# Patient Record
Sex: MALE | Race: WHITE | Employment: OTHER | URBAN - METROPOLITAN AREA
[De-identification: names, ages, dates, MRNs, and addresses within clinical notes are randomized per-mention and may not be internally consistent; named-entity substitution may affect disease eponyms.]

---

## 2020-11-03 PROBLEM — I10 HYPERTENSION: Status: RESOLVED | Noted: 2020-11-03 | Resolved: 2020-11-03

## 2020-11-03 PROBLEM — E78.5 HYPERLIPIDEMIA: Status: RESOLVED | Noted: 2020-11-03 | Resolved: 2020-11-03

## 2021-09-23 ENCOUNTER — HOSPITAL ENCOUNTER (EMERGENCY)
Age: 66
Discharge: ANOTHER HOSPITAL | End: 2021-09-24
Attending: EMERGENCY MEDICINE
Payer: MEDICARE

## 2021-09-23 DIAGNOSIS — U07.1 PNEUMONIA DUE TO COVID-19 VIRUS: Primary | ICD-10-CM

## 2021-09-23 DIAGNOSIS — J12.82 PNEUMONIA DUE TO COVID-19 VIRUS: Primary | ICD-10-CM

## 2021-09-23 DIAGNOSIS — R09.02 HYPOXIA: ICD-10-CM

## 2021-09-23 DIAGNOSIS — R10.84 GENERALIZED ABDOMINAL PAIN: ICD-10-CM

## 2021-09-23 LAB
ALBUMIN SERPL-MCNC: 4 G/DL (ref 3.5–4.6)
ALP BLD-CCNC: 82 U/L (ref 35–104)
ALT SERPL-CCNC: 25 U/L (ref 0–41)
AMYLASE: 79 U/L (ref 22–93)
ANION GAP SERPL CALCULATED.3IONS-SCNC: 12 MEQ/L (ref 9–15)
AST SERPL-CCNC: 27 U/L (ref 0–40)
BILIRUB SERPL-MCNC: 0.5 MG/DL (ref 0.2–0.7)
BUN BLDV-MCNC: 46 MG/DL (ref 8–23)
CALCIUM SERPL-MCNC: 9.2 MG/DL (ref 8.5–9.9)
CHLORIDE BLD-SCNC: 93 MEQ/L (ref 95–107)
CO2: 27 MEQ/L (ref 20–31)
CREAT SERPL-MCNC: 1.15 MG/DL (ref 0.7–1.2)
GFR AFRICAN AMERICAN: >60
GFR NON-AFRICAN AMERICAN: >60
GLOBULIN: 2.8 G/DL (ref 2.3–3.5)
GLUCOSE BLD-MCNC: 119 MG/DL (ref 70–99)
HCT VFR BLD CALC: 47.5 % (ref 42–52)
HEMOGLOBIN: 15.7 G/DL (ref 13.7–17.5)
LIPASE: 96 U/L (ref 12–95)
MCH RBC QN AUTO: 28.6 PG (ref 25.7–32.2)
MCHC RBC AUTO-ENTMCNC: 33.1 % (ref 32.3–36.5)
MCV RBC AUTO: 86.5 FL (ref 79–92.2)
PDW BLD-RTO: 14.5 % (ref 11.6–14.4)
PLATELET # BLD: 131 K/UL (ref 163–337)
POTASSIUM SERPL-SCNC: 4.1 MEQ/L (ref 3.4–4.9)
RBC # BLD: 5.49 M/UL (ref 4.63–6.08)
SODIUM BLD-SCNC: 132 MEQ/L (ref 135–144)
TOTAL PROTEIN: 6.8 G/DL (ref 6.3–8)
WBC # BLD: 11.5 K/UL (ref 4.2–9)

## 2021-09-23 PROCEDURE — 36415 COLL VENOUS BLD VENIPUNCTURE: CPT

## 2021-09-23 PROCEDURE — 85027 COMPLETE CBC AUTOMATED: CPT

## 2021-09-23 PROCEDURE — 80053 COMPREHEN METABOLIC PANEL: CPT

## 2021-09-23 PROCEDURE — 83690 ASSAY OF LIPASE: CPT

## 2021-09-23 PROCEDURE — 99285 EMERGENCY DEPT VISIT HI MDM: CPT

## 2021-09-23 PROCEDURE — 82150 ASSAY OF AMYLASE: CPT

## 2021-09-23 RX ORDER — MORPHINE SULFATE 4 MG/ML
4 INJECTION, SOLUTION INTRAMUSCULAR; INTRAVENOUS ONCE
Status: COMPLETED | OUTPATIENT
Start: 2021-09-23 | End: 2021-09-24

## 2021-09-23 RX ORDER — ONDANSETRON 2 MG/ML
4 INJECTION INTRAMUSCULAR; INTRAVENOUS ONCE
Status: COMPLETED | OUTPATIENT
Start: 2021-09-23 | End: 2021-09-24

## 2021-09-23 RX ORDER — 0.9 % SODIUM CHLORIDE 0.9 %
1000 INTRAVENOUS SOLUTION INTRAVENOUS ONCE
Status: COMPLETED | OUTPATIENT
Start: 2021-09-23 | End: 2021-09-24

## 2021-09-23 ASSESSMENT — ENCOUNTER SYMPTOMS
SHORTNESS OF BREATH: 0
COUGH: 0
COLOR CHANGE: 0
BACK PAIN: 0
ABDOMINAL PAIN: 1
SINUS PAIN: 0
NAUSEA: 0
DIARRHEA: 0
VOMITING: 0
SORE THROAT: 0
EYE DISCHARGE: 0
EYE REDNESS: 0

## 2021-09-23 ASSESSMENT — PAIN DESCRIPTION - LOCATION: LOCATION: ABDOMEN

## 2021-09-23 ASSESSMENT — PAIN SCALES - GENERAL: PAINLEVEL_OUTOF10: 6

## 2021-09-24 ENCOUNTER — APPOINTMENT (OUTPATIENT)
Dept: GENERAL RADIOLOGY | Age: 66
End: 2021-09-24
Payer: MEDICARE

## 2021-09-24 ENCOUNTER — HOSPITAL ENCOUNTER (INPATIENT)
Age: 66
LOS: 1 days | Discharge: HOME | DRG: 177 | End: 2021-09-24
Attending: INTERNAL MEDICINE | Admitting: INTERNAL MEDICINE
Payer: MEDICARE

## 2021-09-24 ENCOUNTER — APPOINTMENT (OUTPATIENT)
Dept: CT IMAGING | Age: 66
End: 2021-09-24
Payer: MEDICARE

## 2021-09-24 VITALS
HEART RATE: 80 BPM | SYSTOLIC BLOOD PRESSURE: 112 MMHG | DIASTOLIC BLOOD PRESSURE: 69 MMHG | BODY MASS INDEX: 37.13 KG/M2 | OXYGEN SATURATION: 96 % | TEMPERATURE: 98.4 F | HEIGHT: 68 IN | RESPIRATION RATE: 18 BRPM | WEIGHT: 245 LBS

## 2021-09-24 VITALS
OXYGEN SATURATION: 95 % | HEART RATE: 82 BPM | DIASTOLIC BLOOD PRESSURE: 65 MMHG | SYSTOLIC BLOOD PRESSURE: 117 MMHG | TEMPERATURE: 97.5 F | RESPIRATION RATE: 18 BRPM

## 2021-09-24 PROBLEM — U07.1 COVID-19: Status: ACTIVE | Noted: 2021-09-24

## 2021-09-24 LAB
BILIRUBIN URINE: NORMAL
BLOOD, URINE: NEGATIVE
CLARITY: CLEAR
COLOR: NORMAL
D DIMER: 1.25 MG/L FEU (ref 0–0.5)
EKG ATRIAL RATE: 71 BPM
EKG P AXIS: 49 DEGREES
EKG P-R INTERVAL: 156 MS
EKG Q-T INTERVAL: 414 MS
EKG QRS DURATION: 144 MS
EKG QTC CALCULATION (BAZETT): 449 MS
EKG R AXIS: -53 DEGREES
EKG T AXIS: -25 DEGREES
EKG VENTRICULAR RATE: 71 BPM
GLUCOSE URINE: NEGATIVE MG/DL
KETONES, URINE: NEGATIVE MG/DL
LEUKOCYTE ESTERASE, URINE: NEGATIVE
NITRITE, URINE: NEGATIVE
PH UA: 5 (ref 5–9)
PROCALCITONIN: 0.11 NG/ML (ref 0–0.15)
PROTEIN UA: NORMAL MG/DL
SARS-COV-2, NAAT: DETECTED
SPECIFIC GRAVITY UA: 1.02 (ref 1–1.03)
URINE REFLEX TO CULTURE: NORMAL
UROBILINOGEN, URINE: 0.2 E.U./DL

## 2021-09-24 PROCEDURE — 71275 CT ANGIOGRAPHY CHEST: CPT

## 2021-09-24 PROCEDURE — 36415 COLL VENOUS BLD VENIPUNCTURE: CPT

## 2021-09-24 PROCEDURE — 2580000003 HC RX 258: Performed by: EMERGENCY MEDICINE

## 2021-09-24 PROCEDURE — 71045 X-RAY EXAM CHEST 1 VIEW: CPT

## 2021-09-24 PROCEDURE — 96375 TX/PRO/DX INJ NEW DRUG ADDON: CPT

## 2021-09-24 PROCEDURE — G0378 HOSPITAL OBSERVATION PER HR: HCPCS

## 2021-09-24 PROCEDURE — 93005 ELECTROCARDIOGRAM TRACING: CPT

## 2021-09-24 PROCEDURE — 81003 URINALYSIS AUTO W/O SCOPE: CPT

## 2021-09-24 PROCEDURE — 84145 PROCALCITONIN (PCT): CPT

## 2021-09-24 PROCEDURE — 87635 SARS-COV-2 COVID-19 AMP PRB: CPT

## 2021-09-24 PROCEDURE — 6360000002 HC RX W HCPCS: Performed by: EMERGENCY MEDICINE

## 2021-09-24 PROCEDURE — XW033E5 INTRODUCTION OF REMDESIVIR ANTI-INFECTIVE INTO PERIPHERAL VEIN, PERCUTANEOUS APPROACH, NEW TECHNOLOGY GROUP 5: ICD-10-PCS | Performed by: INTERNAL MEDICINE

## 2021-09-24 PROCEDURE — 96374 THER/PROPH/DIAG INJ IV PUSH: CPT

## 2021-09-24 PROCEDURE — 6360000004 HC RX CONTRAST MEDICATION: Performed by: EMERGENCY MEDICINE

## 2021-09-24 PROCEDURE — 74176 CT ABD & PELVIS W/O CONTRAST: CPT

## 2021-09-24 PROCEDURE — 99221 1ST HOSP IP/OBS SF/LOW 40: CPT | Performed by: INTERNAL MEDICINE

## 2021-09-24 PROCEDURE — 96361 HYDRATE IV INFUSION ADD-ON: CPT

## 2021-09-24 PROCEDURE — G0379 DIRECT REFER HOSPITAL OBSERV: HCPCS

## 2021-09-24 PROCEDURE — 85379 FIBRIN DEGRADATION QUANT: CPT

## 2021-09-24 PROCEDURE — 1210000000 HC MED SURG R&B

## 2021-09-24 PROCEDURE — 93010 ELECTROCARDIOGRAM REPORT: CPT | Performed by: INTERNAL MEDICINE

## 2021-09-24 RX ORDER — ONDANSETRON 2 MG/ML
4 INJECTION INTRAMUSCULAR; INTRAVENOUS EVERY 6 HOURS PRN
Status: DISCONTINUED | OUTPATIENT
Start: 2021-09-24 | End: 2021-09-24 | Stop reason: HOSPADM

## 2021-09-24 RX ORDER — PANTOPRAZOLE SODIUM 40 MG/1
40 TABLET, DELAYED RELEASE ORAL
Status: DISCONTINUED | OUTPATIENT
Start: 2021-09-25 | End: 2021-09-24 | Stop reason: HOSPADM

## 2021-09-24 RX ORDER — DEXAMETHASONE SODIUM PHOSPHATE 4 MG/ML
6 INJECTION, SOLUTION INTRA-ARTICULAR; INTRALESIONAL; INTRAMUSCULAR; INTRAVENOUS; SOFT TISSUE EVERY 24 HOURS
Status: DISCONTINUED | OUTPATIENT
Start: 2021-09-24 | End: 2021-09-24 | Stop reason: HOSPADM

## 2021-09-24 RX ORDER — 0.9 % SODIUM CHLORIDE 0.9 %
500 INTRAVENOUS SOLUTION INTRAVENOUS ONCE
Status: COMPLETED | OUTPATIENT
Start: 2021-09-24 | End: 2021-09-24

## 2021-09-24 RX ORDER — LISINOPRIL AND HYDROCHLOROTHIAZIDE 12.5; 1 MG/1; MG/1
1 TABLET ORAL DAILY
Status: DISCONTINUED | OUTPATIENT
Start: 2021-09-24 | End: 2021-09-24 | Stop reason: HOSPADM

## 2021-09-24 RX ORDER — SODIUM CHLORIDE 0.9 % (FLUSH) 0.9 %
5-40 SYRINGE (ML) INJECTION PRN
Status: DISCONTINUED | OUTPATIENT
Start: 2021-09-24 | End: 2021-09-24 | Stop reason: HOSPADM

## 2021-09-24 RX ORDER — SODIUM CHLORIDE 0.9 % (FLUSH) 0.9 %
5-40 SYRINGE (ML) INJECTION EVERY 12 HOURS SCHEDULED
Status: DISCONTINUED | OUTPATIENT
Start: 2021-09-24 | End: 2021-09-24 | Stop reason: HOSPADM

## 2021-09-24 RX ORDER — ONDANSETRON 4 MG/1
4 TABLET, ORALLY DISINTEGRATING ORAL EVERY 8 HOURS PRN
Status: DISCONTINUED | OUTPATIENT
Start: 2021-09-24 | End: 2021-09-24 | Stop reason: HOSPADM

## 2021-09-24 RX ORDER — 0.9 % SODIUM CHLORIDE 0.9 %
30 INTRAVENOUS SOLUTION INTRAVENOUS PRN
Status: DISCONTINUED | OUTPATIENT
Start: 2021-09-24 | End: 2021-09-24 | Stop reason: HOSPADM

## 2021-09-24 RX ORDER — DEXAMETHASONE SODIUM PHOSPHATE 10 MG/ML
10 INJECTION, SOLUTION INTRAMUSCULAR; INTRAVENOUS ONCE
Status: COMPLETED | OUTPATIENT
Start: 2021-09-24 | End: 2021-09-24

## 2021-09-24 RX ORDER — ACETAMINOPHEN 325 MG/1
650 TABLET ORAL EVERY 6 HOURS PRN
Status: DISCONTINUED | OUTPATIENT
Start: 2021-09-24 | End: 2021-09-24 | Stop reason: HOSPADM

## 2021-09-24 RX ORDER — POLYETHYLENE GLYCOL 3350 17 G/17G
17 POWDER, FOR SOLUTION ORAL DAILY PRN
Status: DISCONTINUED | OUTPATIENT
Start: 2021-09-24 | End: 2021-09-24 | Stop reason: HOSPADM

## 2021-09-24 RX ORDER — 0.9 % SODIUM CHLORIDE 0.9 %
500 INTRAVENOUS SOLUTION INTRAVENOUS ONCE
Status: DISCONTINUED | OUTPATIENT
Start: 2021-09-24 | End: 2021-09-24 | Stop reason: HOSPADM

## 2021-09-24 RX ORDER — GUAIFENESIN/DEXTROMETHORPHAN 100-10MG/5
5 SYRUP ORAL EVERY 4 HOURS PRN
Status: DISCONTINUED | OUTPATIENT
Start: 2021-09-24 | End: 2021-09-24 | Stop reason: HOSPADM

## 2021-09-24 RX ORDER — ACETAMINOPHEN 650 MG/1
650 SUPPOSITORY RECTAL EVERY 6 HOURS PRN
Status: DISCONTINUED | OUTPATIENT
Start: 2021-09-24 | End: 2021-09-24 | Stop reason: HOSPADM

## 2021-09-24 RX ORDER — SODIUM CHLORIDE 9 MG/ML
25 INJECTION, SOLUTION INTRAVENOUS PRN
Status: DISCONTINUED | OUTPATIENT
Start: 2021-09-24 | End: 2021-09-24 | Stop reason: HOSPADM

## 2021-09-24 RX ORDER — ATORVASTATIN CALCIUM 40 MG/1
40 TABLET, FILM COATED ORAL NIGHTLY
Status: DISCONTINUED | OUTPATIENT
Start: 2021-09-24 | End: 2021-09-24 | Stop reason: HOSPADM

## 2021-09-24 RX ORDER — VITAMIN B COMPLEX
2000 TABLET ORAL DAILY
Status: DISCONTINUED | OUTPATIENT
Start: 2021-09-24 | End: 2021-09-24 | Stop reason: HOSPADM

## 2021-09-24 RX ORDER — ASPIRIN 81 MG/1
81 TABLET ORAL DAILY
Status: DISCONTINUED | OUTPATIENT
Start: 2021-09-24 | End: 2021-09-24 | Stop reason: HOSPADM

## 2021-09-24 RX ADMIN — SODIUM CHLORIDE 500 ML: 9 INJECTION, SOLUTION INTRAVENOUS at 01:24

## 2021-09-24 RX ADMIN — DEXAMETHASONE SODIUM PHOSPHATE 10 MG: 10 INJECTION, SOLUTION INTRAMUSCULAR; INTRAVENOUS at 01:23

## 2021-09-24 RX ADMIN — IOPAMIDOL 100 ML: 755 INJECTION, SOLUTION INTRAVENOUS at 07:13

## 2021-09-24 RX ADMIN — SODIUM CHLORIDE 1000 ML: 9 INJECTION, SOLUTION INTRAVENOUS at 00:03

## 2021-09-24 RX ADMIN — ONDANSETRON 4 MG: 2 INJECTION INTRAMUSCULAR; INTRAVENOUS at 00:05

## 2021-09-24 RX ADMIN — MORPHINE SULFATE 4 MG: 4 INJECTION, SOLUTION INTRAMUSCULAR; INTRAVENOUS at 00:05

## 2021-09-24 ASSESSMENT — PAIN SCALES - GENERAL
PAINLEVEL_OUTOF10: 0
PAINLEVEL_OUTOF10: 1
PAINLEVEL_OUTOF10: 7

## 2021-09-24 ASSESSMENT — PAIN DESCRIPTION - LOCATION: LOCATION: ABDOMEN

## 2021-09-24 NOTE — ED NOTES
Walking pulse ox decreased to 84% while ambulating in room on room air. Patient denied any shortness of breath or difficulty breathing.       Anum Zelaya RN  09/24/21 [id number]

## 2021-09-24 NOTE — ED NOTES
TC returned call connecting Dr. Marlene Vogt and Dr. Mamta Pressley who accepts pt to [facility].       Elizabet Mg RN  09/24/21 [id number]

## 2021-09-24 NOTE — CONSULTS
Pulmonary Medicine  Consult Note      Reason for consultation: COVID-19 infection    Consulting physician: Dr. Chong Martin:      This is 76-year-old male with significant past medical history for hypertension, hyperlipidemia, diabetes mellitus was presented at ER at [facility] with abdominal pain. Patient was in ER and he has questionable hypoxia in the emergency room when he ambulated O2 sat drops in 80s so he was transferred to [facility]. .  He had a CT which was negative for PE showed bilateral infiltration consistent with COVID-19 pneumonia patient was admitted here due to Covid infection with pneumonia and hypoxia but patient was not hypoxic when he came here his room air O2 saturation was 95%. As per RN he has no O2 desaturation even going to bathroom in his room. Patient qualified for remdesivir due to documented hypoxia but patient declined and wanted to go home. When I came here patient is ready to go home and he is dressed up. I discussed with RN he also declined to take pulse oximetry at home. He denies having any shortness of breath. No cough or sputum production. He said he had a headache Monday and Tuesday and he think he has low-grade fever for 2 days but then he was feeling fine until last night he has some abdominal pain which he said it completely resolved. It looks like he is declining any treatment and wants to go home. Past Medical History:        Diagnosis Date    Arthritis     [facility] [address]     Diabetes mellitus ([facility] [address]     Hyperlipidemia     Hypertension     Hyperuricemia     Obesity        Past Surgical History:        Procedure Laterality Date    BACK SURGERY  1984    COLONOSCOPY  2006    SKIN BIOPSY      TONSILLECTOMY AND ADENOIDECTOMY         Social History:     reports that he has never smoked. He has never used smokeless tobacco. He reports that he does not drink alcohol and does not use drugs.     Family History: Problem Relation Age of Onset    Diabetes Father        Allergies:  Patient has no known allergies. MEDICATIONS during current hospitalization:    Continuous Infusions:   sodium chloride         Scheduled Meds:   sodium chloride flush  5-40 mL IntraVENous 2 times per day    enoxaparin  30 mg SubCUTAneous BID    [START ON 9/25/2021] pantoprazole  40 mg Oral QAM AC    insulin lispro  0-12 Units SubCUTAneous TID WC    insulin lispro  0-6 Units SubCUTAneous Nightly    dexamethasone  6 mg IntraVENous Q24H    Vitamin D  2,000 Units Oral Daily    lisinopril-hydroCHLOROthiazide  1 tablet Oral Daily    aspirin  81 mg Oral Daily    atorvastatin  40 mg Oral Nightly    remdesivir IVPB  200 mg IntraVENous Once    Followed by   Gloria Jarrell ON 9/25/2021] remdesivir IVPB  100 mg IntraVENous Q24H       PRN Meds:sodium chloride flush, sodium chloride, ondansetron **OR** ondansetron, polyethylene glycol, acetaminophen **OR** acetaminophen, guaiFENesin-dextromethorphan, sodium chloride    REVIEW OF SYSTEMS:  As in history of present illness  Other 14 point review of system is negative. PHYSICAL EXAM:    Vitals:  /65   Pulse 82   Temp 97.5 °F (36.4 °C) (Oral)   Resp 18   SpO2 95%   General:  Alert, awake, Oriented x3  , No distress. Head: Atraumatic , Normocephalic   Eyes: PERRL. No sclera icterus. No conjunctival injection. No discharge   Chest : Bilaterally symmetrical ,Normal effort,  No accessory muscle use  Lung :  No Rales. No wheezing. No rhonchi. Heart[de-identified] normal  rate. Regular rhythm. No mumur   ABD: Non-tender. Non-distended. No masses. No organmegaly. Normal bowel sounds. No hernia. Extremities: No pitting in both lower leg , No Cyanosis ,No clubbing  Neuro: no focal weakness   Skin: Warm and dry. No erythema rash on exposed extremities.         Data Review  Recent Labs     09/23/21  2304   WBC 11.5*   HGB 15.7   HCT 47.5   *      Recent Labs     09/23/21  2304   *   K 4.1   CL 93*   CO2 27   BUN 46*   CREATININE 1.15   GLUCOSE 119*       MV Settings: ABGs: No results for input(s): PHART, DTI1SVQ, PO2ART, GRG4VPE, BEART, O1ABZITT, URO5JBH in the last 72 hours. O2 Device: None (Room air)  No results found for: [address]    Radiology  CT ABDOMEN PELVIS WO CONTRAST Additional Contrast? None    Result Date: 9/24/2021  COMPARISON: March 6, 2014. HISTORY: covid TECHNIQUE: AP view FINDINGS: Opacities are seen in both bases, right greater than left. The right hemidiaphragm is again elevated. . The cardiac silhouette is within normal limits of size. Degenerative changes are seen in the right shoulder and spine. Bilateral infiltrates likely a normal CT ABDOMEN and PELVIS WITHOUT CONTRAST COMPARISON: None available. HISTORY: Severe cramping and bloating TECHNIQUE: procedure Thin slice spiral CT was performed from the lung bases through the iliac crests without contrast administration. Contrast enhancement was not employed due to clinician's order. Sagittal and coronal reconstructions were also performed. FINDINGS: Images through the lung bases demonstrate bilateral infiltrates, right greater than left. Also trace of pleural fluid is seen on the left. . The right hemidiaphragm is elevated. Non-contrast images of the liver, gall bladder, pancreas and adrenals are unremarkable. The spleen measures 13.8 cm AP. Mild perinephric stranding seen about both kidneys. A 4 mm nonobstructing stone is seen in the right kidney. In the left kidney multiple stones are seen. The largest visualized measures 5 mm. They're nonobstructing. A 9 mm hypodensity is seen in the lateral aspect of the left kidney and a 7 mm hypodensity is seen in the medial aspect. .  No aneurysm  is visualized. Atherosclerotic  calcifications are seen. No  dilated loops of bowel, free air or free fluid seen. Fluid is seen within bowel. Diverticulosis coli seen without evidence for diverticulitis. The prostate gland is enlarged. left upper lobe. Consolidative opacity versus atelectasis right lower lobe and left lower lobe. Volume loss right lung. Possible trace bilateral pleural effusions. No pneumothorax. Thoracic inlet, heart, and mediastinum:  Visualized thyroid unremarkable. No axillary, mediastinal, or hilar lymphadenopathy. Mild thoracic aorta atherosclerotic calcifications without aneurysm. Normal pulmonary artery size. Normal heart size. Scattered coronary artery calcifications. No pericardial effusion or thickening. Small hiatal hernia. Bones:  No acute osseous findings. No destructive osseous lesions. Degenerative changes within the spine. Degenerative changes both shoulders. Soft tissues: Unremarkable. Upper abdomen:  Refer to recent CT abdomen. No CT evidence for pulmonary embolism. Opacities in the upper lobes, likely infectious in etiology. Opacities versus atelectasis within both lower lobes with volume loss of the right hemithorax. XR CHEST PORTABLE    Result Date: 9/24/2021  COMPARISON: March 6, 2014. HISTORY: covid TECHNIQUE: AP view FINDINGS: Opacities are seen in both bases, right greater than left. The right hemidiaphragm is again elevated. . The cardiac silhouette is within normal limits of size. Degenerative changes are seen in the right shoulder and spine. Bilateral infiltrates likely a normal CT ABDOMEN and PELVIS WITHOUT CONTRAST COMPARISON: None available. HISTORY: Severe cramping and bloating TECHNIQUE: procedure Thin slice spiral CT was performed from the lung bases through the iliac crests without contrast administration. Contrast enhancement was not employed due to clinician's order. Sagittal and coronal reconstructions were also performed. FINDINGS: Images through the lung bases demonstrate bilateral infiltrates, right greater than left. Also trace of pleural fluid is seen on the left. . The right hemidiaphragm is elevated.  Non-contrast images of the liver, gall bladder, pancreas and adrenals are unremarkable. The spleen measures 13.8 cm AP. Mild perinephric stranding seen about both kidneys. A 4 mm nonobstructing stone is seen in the right kidney. In the left kidney multiple stones are seen. The largest visualized measures 5 mm. They're nonobstructing. A 9 mm hypodensity is seen in the lateral aspect of the left kidney and a 7 mm hypodensity is seen in the medial aspect. .  No aneurysm  is visualized. Atherosclerotic  calcifications are seen. No  dilated loops of bowel, free air or free fluid seen. Fluid is seen within bowel. Diverticulosis coli seen without evidence for diverticulitis. The prostate gland is enlarged. Bilateral inguinal hernias are seen that contain fat, left larger than right. Degenerative changes are seen in both hips. No destructive bony lesions are seen. No destructive bony lesions are evident. IMPRESSION: 1. Bilateral infiltrates 2. Findings may reflect ileus although enteritis is not excluded. 3. Bilateral nonobstructing nephroliths are seen. Small hypodensities are seen in the left kidney that could represent cysts but are indeterminate. 4. Prostatomegaly  All CT scans at this facility use dose modulation, iterative reconstruction, and/or weight based dosing when appropriate to reduce radiation dose to as low as reasonably achievable. Assessment and  plan:     1. COVID-19 infection with pneumonia    Patient was admitted from [place] with hypoxia due to COVID-19 but no hypoxia upon arrival no respiratory complaint. Wants to go home. He is declining all treatment. He has been vaccinated. He had no symptoms at this time but I discussed with him if he develop any shortness of breath or cough with does not resolve, advised to go to ER and have evaluation done again. I will for pulse oximetry and tell him that he should check his oxygen saturation at home and advised to have pulse oximeter from here for  home but he declines and wants to go home. Discussed with RN.   He is waiting for ride to go home    Thank you for consult    NOTE: This report was transcribed using voice recognition software. Every effort was made to ensure accuracy; however, inadvertent computerized transcription errors may be present.     Electronically signed by Jef Chung MD, FCCP on 9/24/2021 at 2:35 PM

## 2021-09-24 NOTE — H&P
Department of Internal Medicine  General Internal Medicine  Attending History and Physical      CHIEF COMPLAINT:  abd pain    Reason for Admission:  Hypoxia, COVID    History Obtained From:  patient    HISTORY OF PRESENT ILLNESS:      The patient is a 78 y.o. male with significant past medical history of HTN/HLD, DM2 who presents from [facility] ED for hypoxia with exertion and COVID-19 pneumonia. Pt was diagnosed last week after feeling ill for a couple of days. States he had intractable fevers Sunday and Monday. No sob, cough, cp. States all of his symptoms resolved, but yesterday he developed abd pain which is why he came to the ED. ED workup was unremarkable except for CTA showing bilateral infiltrates consistent with COVID-19. Pt was not hypoxic, but when he got up to ambulate, per ED report, his O2 dropped into the 80s so he was transferred to [facility] for further management and treatment. Upon arrival, patient was 96% on RA even after ambulating. No respiratory complaints. Pt states he was watching the pulse ox when they were measuring and states it never dropped below 90% so he's unsure of where the 80 number came from. States that his abd pain is resolved. Asking to leave as he currently has zero complaints or symptoms. Upon exam, patient pacing in room, no distress, no dyspnea. Measured pulse ox during ambulation in room and SpO2 95% on room air.     Past Medical History:        Diagnosis Date    Arthritis     [facility] [address]     Diabetes mellitus ([facility] [address]     Hyperlipidemia     Hypertension     Hyperuricemia     Obesity      Past Surgical History:        Procedure Laterality Date    BACK SURGERY  1984    COLONOSCOPY  2006    SKIN BIOPSY      TONSILLECTOMY AND ADENOIDECTOMY         Medications Prior to Admission:    Medications Prior to Admission: metFORMIN (GLUCOPHAGE) 500 MG tablet, Take 2 tablets by mouth daily  lisinopril-hydrochlorothiazide (PRINZIDE;ZESTORETIC) 10-12.5 MG per tablet, Take 1 tablet by mouth daily. atorvastatin (LIPITOR) 40 MG tablet, Take 1 tablet by mouth daily. Cholecalciferol (VITAMIN D) 2000 UNITS CAPS capsule, Take 1 capsule by mouth daily. allopurinol (ZYLOPRIM) 100 MG tablet, Two tablets daily to prevent gout. Coenzyme Q10 (CO Q 10) 100 MG CAPS, Take 1 tablet by mouth. Glucosamine-Chondroitin 500-400 MG CAPS, Take  by mouth. aspirin 81 MG chewable tablet, Take 162 mg by mouth daily. fish oil-omega-3 fatty acids 1000 MG capsule, Take 2 g by mouth daily. vitamin D (ERGOCALCIFEROL) 62839 UNITS CAPS capsule, Take 1 capsule by mouth every 14 days. Testosterone (ANDROGEL) 20.25 MG/1.25GM (1.62%) GEL, Place 4 Act onto the skin daily. ibuprofen (ADVIL;MOTRIN) 200 MG CAPS, Take 1 capsule by mouth as needed. Allergies:  Patient has no known allergies. Social History:   Never smoker, occ etoh, no drugs    Family History:       Problem Relation Age of Onset    Diabetes Father      REVIEW OF SYSTEMS:  12 point ROS was negative unless otherwise noted in the HPI   PHYSICAL EXAM:    Vitals:  /65   Pulse 82   Temp 97.5 °F (36.4 °C) (Oral)   Resp 18   SpO2 95%     Constitutional: Awake and alert in no acute distress. Ambulating in room.  No distress  Head: Normocephalic, atraumatic  Eyes: EOMI, PERRLA  ENT: moist mucous membranes  Neck: neck supple, trachea midline  Lungs: Good inspiratory effort, no wheeze, no rhonchi, no rales  Heart: RRR, normal S1 and S2  GI: Soft, non-distended, non tender, no guarding, no rebound, +BS  MSK: no edema noted  Skin: warm, dry  Psych: appropriate affect       DATA:  CBC:   Lab Results   Component Value Date    WBC 11.5 09/23/2021    RBC 5.49 09/23/2021    RBC 5.23 05/10/2012    HGB 15.7 09/23/2021    HCT 47.5 09/23/2021    MCV 86.5 09/23/2021    MCH 28.6 09/23/2021    MCHC 33.1 09/23/2021    RDW 14.5 09/23/2021     09/23/2021    MPV 11.8 03/06/2014     CMP:    Lab Results   Component Value Date     09/23/2021    K 4.1 09/23/2021    CL 93 09/23/2021    CO2 27 09/23/2021    BUN 46 09/23/2021    CREATININE 1.15 09/23/2021    GFRAA >60.0 09/23/2021    LABGLOM >60.0 09/23/2021    GLUCOSE 119 09/23/2021    GLUCOSE 113 05/10/2012    PROT 6.8 09/23/2021    LABALBU 4.0 09/23/2021    LABALBU 4.5 05/10/2012    CALCIUM 9.2 09/23/2021    BILITOT 0.5 09/23/2021    ALKPHOS 82 09/23/2021    AST 27 09/23/2021    ALT 25 09/23/2021     ASSESSMENT AND PLAN:      # [id number]  - questionable hypoxia in the ED. Pt 95% on RA even during ambulation/exertion  - due to documented hypoxia, pt did qualify for Remdesivir, which was offered to patient even for 1 or two doses, but pt declined and wants to go home  - offered pulse oximeter - pt declined. States he has no respiratory symptoms  - CTA negative for PE. Does show bilateral infiltrates consistent with COVID pneumonia      Disposition: Pt admitted from [place] with hypoxia due to COVID, but no hypoxia upon arrival and no respiratory complaints. Wants to go home and declining treatment. Will discharge home.        [facility] DO  Internal Medicine

## 2021-09-24 NOTE — ED NOTES
TC contacted to initiate transfer to ED [facility].       Pato Jessica RN  09/24/21 [id number]

## 2021-09-24 NOTE — ED NOTES
Pt returned from CT and O2 noted to be in mid 90s. Pt placed on 2L NC with improvement in O2, still very weak. Dr. Jeramie Madrigal notified of lower O2. EKG and CXR ordered.       Shruthi Santamaria RN  09/24/21 8090

## 2021-09-24 NOTE — FLOWSHEET NOTE
1100- Patient arrived to floor, ambulates independently. Patient 95% on RA. Breathing is unlabored. 1200- Patient up ambulating in room to bathroom, pulse ox 93% on RA during ambulation. 1400- Patient denied need for pulse oximeter at home. Patient given discharge instructions, IV's removed from bilateral AC. Patient states that he has all of his belongings including clothing, cell phone, and .

## 2021-09-24 NOTE — ED NOTES
[facility] to [facility].       Perlita Kerns RN  09/24/21 [id number]

## 2021-09-24 NOTE — DISCHARGE SUMMARY
Physician Discharge Summary     Patient ID:  Wild Gloria  [id number]  89 y.o.  1955    Admit date: 9/24/2021    Discharge date : 09/24/21     Admitting Physician: Remy Yu MD     Discharge Physician: Palak Granados DO     Admission Diagnoses: [id number] [U07.1]    Discharge Diagnoses: COVID-19    Admission Condition: good    Discharged Condition: good    Hospital Course: 78 y.o. male with significant past medical history of HTN/HLD, DM2 who presents from [place] ED for hypoxia with exertion and COVID-19 pneumonia. Pt was diagnosed last week after feeling ill for a couple of days. States he had intractable fevers Sunday and Monday. No sob, cough, cp. States all of his symptoms resolved, but yesterday he developed abd pain which is why he came to the ED. ED workup was unremarkable except for CTA showing bilateral infiltrates consistent with COVID-19. Pt was not hypoxic, but when he got up to ambulate, per ED report, his O2 dropped into the 80s so he was transferred to [facility] for further management and treatment. Upon arrival, patient was 96% on RA even after ambulating. No respiratory complaints. Pt states he was watching the pulse ox when they were measuring and states it never dropped below 90% so he's unsure of where the 80 number came from. States that his abd pain is resolved. Asking to leave as he currently has zero complaints or symptoms. Upon exam, patient pacing in room, no distress, no dyspnea. Measured pulse ox during ambulation in room and SpO2 95% on room air. Pt offered opportunity to stay overnight and receive Remdesivir. Pt declines and states he wants to be discharged. As pt asymptomatic and no hypoxia, and declining treatment, pt ok for discharge and was discharged home in stable and improved condition. Pt instructed to return if sob or worsening symptoms. Pt offered pulse oximeter and pt declined as well as no respiratory complaints.     Consults: none      Discharge Exam:  Constitutional: contrast administration. Contrast enhancement was not employed due to clinician's order. Sagittal and coronal reconstructions were also  performed. FINDINGS: Images through the lung bases demonstrate bilateral infiltrates, right greater than left. Also trace of pleural fluid is seen on the left. . The right hemidiaphragm is elevated. Non-contrast images of the liver, gall bladder, pancreas and adrenals are unremarkable. The spleen measures 13.8 cm AP. Mild perinephric stranding seen about both kidneys. A 4 mm nonobstructing stone is seen in the right kidney. In the left kidney  multiple stones are seen. The largest visualized measures 5 mm. They're nonobstructing. A 9 mm hypodensity is seen in the lateral aspect of the left kidney and a 7 mm hypodensity is seen in the medial aspect. .  No aneurysm  is visualized. Atherosclerotic  calcifications are seen. No  dilated loops of bowel, free air or free fluid seen. Fluid is seen within bowel. Diverticulosis coli seen without evidence for diverticulitis. The prostate gland is enlarged. Bilateral inguinal hernias are seen that contain fat, left larger than  right. Degenerative changes are seen in both hips. No destructive bony lesions are seen. No destructive bony lesions are evident. IMPRESSION:    1. Bilateral infiltrates  2. Findings may reflect ileus although enteritis is not excluded. 3. Bilateral nonobstructing nephroliths are seen. Small hypodensities are seen in the left kidney that could represent cysts but are indeterminate. 4. Prostatomegaly      All CT scans at this facility use dose modulation, iterative reconstruction, and/or weight based dosing when appropriate to reduce radiation dose to as low as reasonably achievable. Echo No results found for this or any previous visit.       Disposition: home    In process/preliminary results:  Outstanding Order Results     Date and Time Order Name Status Description    9/24/2021 10:18 AM Procalcitonin In process           Patient Instructions:   Current Discharge Medication List      CONTINUE these medications which have NOT CHANGED    Details   metFORMIN (GLUCOPHAGE) 500 MG tablet Take 2 tablets by mouth daily      lisinopril-hydrochlorothiazide (PRINZIDE;ZESTORETIC) 10-12.5 MG per tablet Take 1 tablet by mouth daily. Qty: 14 tablet, Refills: 0    Associated Diagnoses: HTN (hypertension)      atorvastatin (LIPITOR) 40 MG tablet Take 1 tablet by mouth daily. Qty: 90 tablet, Refills: 3      Cholecalciferol (VITAMIN D) 2000 UNITS CAPS capsule Take 1 capsule by mouth daily. Qty: 90 capsule, Refills: 3      allopurinol (ZYLOPRIM) 100 MG tablet Two tablets daily to prevent gout. Qty: 180 tablet, Refills: 3    Associated Diagnoses: Hyperuricemia      Coenzyme Q10 (CO Q 10) 100 MG CAPS Take 1 tablet by mouth. Associated Diagnoses: Hyperlipemia      Glucosamine-Chondroitin 500-400 MG CAPS Take  by mouth. aspirin 81 MG chewable tablet Take 162 mg by mouth daily. fish oil-omega-3 fatty acids 1000 MG capsule Take 2 g by mouth daily. vitamin D (ERGOCALCIFEROL) 24259 UNITS CAPS capsule Take 1 capsule by mouth every 14 days. Qty: 24 capsule, Refills: 3      Testosterone (ANDROGEL) 20.25 MG/1.25GM (1.62%) GEL Place 4 Act onto the skin daily. Qty: 2.5 g, Refills: 5    Associated Diagnoses: Testosterone deficiency      ibuprofen (ADVIL;MOTRIN) 200 MG CAPS Take 1 capsule by mouth as needed. Activity: activity as tolerated  Diet: diabetic diet  Wound Care: none needed    Follow-up with MIRZA Cade  in 1 week.     DC time 35 minutes    Signed:  Electronically signed by Consuelo Alford DO on 9/24/2021 at 12:14 PM

## 2021-09-24 NOTE — ED NOTES
Pt is provided with a regular breakfast tray, while awaiting transport to [facility]. Pt is aware he will be transported at 0900, this morning.       Tisha Sawant RN  09/24/21 [id number]

## 2021-09-27 ENCOUNTER — CARE COORDINATION (OUTPATIENT)
Dept: CASE MANAGEMENT | Age: 66
End: 2021-09-27

## 2021-09-27 DIAGNOSIS — U07.1 COVID-19: Primary | ICD-10-CM

## 2021-09-27 PROCEDURE — 1111F DSCHRG MED/CURRENT MED MERGE: CPT | Performed by: PHYSICIAN ASSISTANT

## 2021-09-27 NOTE — CARE COORDINATION
[place] [address] Transitions Initial Follow Up Covid Call    Call within 2 business days of discharge: Yes    Patient: Elliott Novak Patient : 1955   MRN: <[id number]>  Reason for Admission: Covid 19   Discharge Date: 21 RARS: Readmission Risk Score: 9      Last Discharge [facility]       Complaint Diagnosis Description Type Department Provider    21   Admission (Discharged) [address], DO        Initial Covid Patient call:    Patient states he is feeling fine, but still tired. Denies, Chest pain, SOB, fever & chills, loss of taste or smell, extreme fatigue, dizziness, N/V/D, Pain and body aches, confusion. Has non productive dry cough. Reviewed medications with patient. Patient confirms they have all medications and medications are being taken as directed. There are no questions concerning medications at this time. [id number] sent to PCP  Patient has fair appetite and is drinking adequate fluids. Normal bladder and bowel elimination patterns. Reviewed appointments with patient. Will see PCP 2021. Explained the Covid Transition to Home program to patient and that they are followed for 30 days after discharge. Patient expresses understanding. Will continue to follow. Kemar Mcneal LPN    [phone]  [facility] / [place] [address] Coordinator    Patient contacted regarding COVID-19 risk, exposure, diagnosis, pulse oximeter ordered at discharge and monoclonal antibody infusion follow up. Discussed COVID-19 related testing which was available at this time. Test results were positive. Patient informed of results, if available? Yes. N Care Coordinator contacted the patient by telephone to perform post discharge assessment. Call within 2 business days of discharge: Yes. Verified name and  with patient as identifiers.  Provided introduction to self, and explanation of the CTN/ACM role, and reason for call due to risk factors for infection and/or symptoms?: Yes  Patient-reported symptoms: Fatigue, Cough  Do you have a copy of your discharge instructions?: Yes  Do you have all of your prescriptions and are they filled?: Yes  Have you been contacted by a [facility] [address]?: No  Have you scheduled your follow up appointment?: Yes (Comment: PCP 9/30/2021)  How are you going to get to your appointment?: Other  Were you discharged with any Home Care or Post Acute Services: No  Do you feel like you have everything you need to keep you well at home?: Yes  Care Transitions Interventions  No Identified Needs         Follow Up  No future appointments.     Maria Paul LPN

## 2021-10-05 ENCOUNTER — CARE COORDINATION (OUTPATIENT)
Dept: CASE MANAGEMENT | Age: 66
End: 2021-10-05

## 2021-10-05 NOTE — CARE COORDINATION
[facility] Transitions Follow Up Call    10/5/2021    Patient: Jaime Garcia  Patient : 1955   MRN: [id number]  Reason for Admission: Covid 19  Discharge Date: 21 RARS: Readmission Risk Score: 9    Called and spoke briefly to patient's wife. She states patient has Virtual Visit in 5 min with PCP. Patient's wife confirms all medications are available and are being taken as directed. Will continue to follow. Ricky Mckeon LPN    [phone]  [facility] / [address] [place] [address] Transitions Subsequent and Final Call    Subsequent and Final Calls  Do you have any ongoing symptoms?: Yes  Patient-reported symptoms: Pain  Have your medications changed?: No  Do you have any questions related to your medications?: No  Do you currently have any active services?: No  Do you have any needs or concerns that I can assist you with?: No  Identified Barriers: None  Care Transitions Interventions  Other Interventions: Follow Up  No future appointments.     Ricky Mckeon LPN

## 2021-10-12 ENCOUNTER — CARE COORDINATION (OUTPATIENT)
Dept: CASE MANAGEMENT | Age: 66
End: 2021-10-12

## 2021-10-12 NOTE — CARE COORDINATION
[facility] Transitions Follow Up Call   COVID    10/12/2021    Patient: Jeremías Khan  Patient : 1955   MRN: [id number]  Reason for Admission: Covid 19  Discharge Date: 21 RARS: Readmission Risk Score: 9    Attempted to contact patient for Transition Subsequent call. Left HIPAA Compliant message on Voice Mail to call CTN (number given) with any questions and an update on patient's condition since discharge. Left HIPAA Compliant message on voice mail for Patient that this is the Final Transition Call and to call their Specialist/PCP for any problems or issues that may occur. Negro Mcknight LPN    [phone]  [facility] / [facility] Transitions Subsequent and Final Call    Subsequent and Final Calls  Care Transitions Interventions  Other Interventions: Follow Up  No future appointments.     Negro Mcknight LPN

## 2022-01-24 ENCOUNTER — APPOINTMENT (OUTPATIENT)
Dept: GENERAL RADIOLOGY | Age: 67
End: 2022-01-24
Payer: MEDICARE

## 2022-01-24 ENCOUNTER — HOSPITAL ENCOUNTER (EMERGENCY)
Age: 67
Discharge: ANOTHER HOSPITAL | End: 2022-01-24
Payer: MEDICARE

## 2022-01-24 ENCOUNTER — APPOINTMENT (OUTPATIENT)
Dept: CT IMAGING | Age: 67
End: 2022-01-24
Payer: MEDICARE

## 2022-01-24 VITALS
WEIGHT: 235 LBS | SYSTOLIC BLOOD PRESSURE: 125 MMHG | BODY MASS INDEX: 34.8 KG/M2 | TEMPERATURE: 98.2 F | RESPIRATION RATE: 15 BRPM | DIASTOLIC BLOOD PRESSURE: 68 MMHG | HEART RATE: 73 BPM | HEIGHT: 69 IN | OXYGEN SATURATION: 96 %

## 2022-01-24 DIAGNOSIS — S01.81XA LACERATION OF FACE, MULTIPLE SITES: ICD-10-CM

## 2022-01-24 DIAGNOSIS — S52.91XB TYPE I OR II OPEN FRACTURE OF RIGHT RADIUS AND ULNA, INITIAL ENCOUNTER: ICD-10-CM
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
## 2022-01-24 DIAGNOSIS — S02.0XXB OPEN FRACTURE OF FRONTAL BONE, INITIAL ENCOUNTER (HCC): ICD-10-CM

## 2022-01-24 DIAGNOSIS — S52.201B TYPE I OR II OPEN FRACTURE OF RIGHT RADIUS AND ULNA, INITIAL ENCOUNTER: ICD-10-CM

## 2022-01-24 LAB
ALBUMIN SERPL-MCNC: 4 G/DL (ref 3.5–4.6)
ALP BLD-CCNC: 82 U/L (ref 35–104)
ALT SERPL-CCNC: 20 U/L (ref 0–41)
ANION GAP SERPL CALCULATED.3IONS-SCNC: 12 MEQ/L (ref 9–15)
APTT: 25.1 SEC (ref 24.4–36.8)
AST SERPL-CCNC: 33 U/L (ref 0–40)
BASOPHILS ABSOLUTE: 0 K/UL (ref 0–0.2)
BASOPHILS RELATIVE PERCENT: 0.2 %
BILIRUB SERPL-MCNC: 0.4 MG/DL (ref 0.2–0.7)
BUN BLDV-MCNC: 18 MG/DL (ref 8–23)
CALCIUM SERPL-MCNC: 9.5 MG/DL (ref 8.5–9.9)
CHLORIDE BLD-SCNC: 103 MEQ/L (ref 95–107)
CO2: 27 MEQ/L (ref 20–31)
CREAT SERPL-MCNC: 0.65 MG/DL (ref 0.7–1.2)
EOSINOPHILS ABSOLUTE: 0.1 K/UL (ref 0–0.7)
EOSINOPHILS RELATIVE PERCENT: 0.5 %
GFR AFRICAN AMERICAN: >60
GFR NON-AFRICAN AMERICAN: >60
GLOBULIN: 3.1 G/DL (ref 2.3–3.5)
GLUCOSE BLD-MCNC: 128 MG/DL (ref 70–99)
HCT VFR BLD CALC: 41.3 % (ref 42–52)
HEMOGLOBIN: 13.7 G/DL (ref 14–18)
INR BLD: 0.9
LYMPHOCYTES ABSOLUTE: 2.2 K/UL (ref 1–4.8)
LYMPHOCYTES RELATIVE PERCENT: 13.8 %
MCH RBC QN AUTO: 28.3 PG (ref 27–31.3)
MCHC RBC AUTO-ENTMCNC: 33.1 % (ref 33–37)
MCV RBC AUTO: 85.5 FL (ref 80–100)
MONOCYTES ABSOLUTE: 0.8 K/UL (ref 0.2–0.8)
MONOCYTES RELATIVE PERCENT: 4.8 %
NEUTROPHILS ABSOLUTE: 13 K/UL (ref 1.4–6.5)
NEUTROPHILS RELATIVE PERCENT: 80.7 %
PDW BLD-RTO: 15 % (ref 11.5–14.5)
PLATELET # BLD: 160 K/UL (ref 130–400)
POTASSIUM SERPL-SCNC: 4.6 MEQ/L (ref 3.4–4.9)
PROTHROMBIN TIME: 12.5 SEC (ref 12.3–14.9)
RBC # BLD: 4.83 M/UL (ref 4.7–6.1)
SODIUM BLD-SCNC: 142 MEQ/L (ref 135–144)
TOTAL PROTEIN: 7.1 G/DL (ref 6.3–8)
WBC # BLD: 16.1 K/UL (ref 4.8–10.8)

## 2022-01-24 PROCEDURE — 73060 X-RAY EXAM OF HUMERUS: CPT

## 2022-01-24 PROCEDURE — 86900 BLOOD TYPING SEROLOGIC ABO: CPT

## 2022-01-24 PROCEDURE — A4217 STERILE WATER/SALINE, 500 ML: HCPCS

## 2022-01-24 PROCEDURE — 71045 X-RAY EXAM CHEST 1 VIEW: CPT

## 2022-01-24 PROCEDURE — 70450 CT HEAD/BRAIN W/O DYE: CPT

## 2022-01-24 PROCEDURE — 86850 RBC ANTIBODY SCREEN: CPT

## 2022-01-24 PROCEDURE — 80053 COMPREHEN METABOLIC PANEL: CPT

## 2022-01-24 PROCEDURE — 90715 TDAP VACCINE 7 YRS/> IM: CPT | Performed by: STUDENT IN AN ORGANIZED HEALTH CARE EDUCATION/TRAINING PROGRAM

## 2022-01-24 PROCEDURE — 6360000002 HC RX W HCPCS: Performed by: STUDENT IN AN ORGANIZED HEALTH CARE EDUCATION/TRAINING PROGRAM

## 2022-01-24 PROCEDURE — 70486 CT MAXILLOFACIAL W/O DYE: CPT

## 2022-01-24 PROCEDURE — 73070 X-RAY EXAM OF ELBOW: CPT

## 2022-01-24 PROCEDURE — 99285 EMERGENCY DEPT VISIT HI MDM: CPT

## 2022-01-24 PROCEDURE — 99283 EMERGENCY DEPT VISIT LOW MDM: CPT | Performed by: SURGERY

## 2022-01-24 PROCEDURE — 36415 COLL VENOUS BLD VENIPUNCTURE: CPT

## 2022-01-24 PROCEDURE — 73090 X-RAY EXAM OF FOREARM: CPT

## 2022-01-24 PROCEDURE — 2580000003 HC RX 258: Performed by: STUDENT IN AN ORGANIZED HEALTH CARE EDUCATION/TRAINING PROGRAM

## 2022-01-24 PROCEDURE — 29125 APPL SHORT ARM SPLINT STATIC: CPT

## 2022-01-24 PROCEDURE — 85730 THROMBOPLASTIN TIME PARTIAL: CPT

## 2022-01-24 PROCEDURE — 2580000003 HC RX 258

## 2022-01-24 PROCEDURE — 96365 THER/PROPH/DIAG IV INF INIT: CPT

## 2022-01-24 PROCEDURE — 85025 COMPLETE CBC W/AUTO DIFF WBC: CPT

## 2022-01-24 PROCEDURE — 85610 PROTHROMBIN TIME: CPT

## 2022-01-24 PROCEDURE — 96376 TX/PRO/DX INJ SAME DRUG ADON: CPT

## 2022-01-24 PROCEDURE — 86901 BLOOD TYPING SEROLOGIC RH(D): CPT

## 2022-01-24 PROCEDURE — 12013 RPR F/E/E/N/L/M 2.6-5.0 CM: CPT

## 2022-01-24 PROCEDURE — 93005 ELECTROCARDIOGRAM TRACING: CPT | Performed by: STUDENT IN AN ORGANIZED HEALTH CARE EDUCATION/TRAINING PROGRAM

## 2022-01-24 PROCEDURE — 90471 IMMUNIZATION ADMIN: CPT | Performed by: STUDENT IN AN ORGANIZED HEALTH CARE EDUCATION/TRAINING PROGRAM

## 2022-01-24 PROCEDURE — 6360000002 HC RX W HCPCS: Performed by: EMERGENCY MEDICINE

## 2022-01-24 PROCEDURE — 96375 TX/PRO/DX INJ NEW DRUG ADDON: CPT

## 2022-01-24 PROCEDURE — 6830039000 HC L3 TRAUMA ALERT

## 2022-01-24 PROCEDURE — 72125 CT NECK SPINE W/O DYE: CPT

## 2022-01-24 PROCEDURE — 29515 APPLICATION SHORT LEG SPLINT: CPT

## 2022-01-24 RX ORDER — ONDANSETRON 2 MG/ML
4 INJECTION INTRAMUSCULAR; INTRAVENOUS ONCE
Status: COMPLETED | OUTPATIENT
Start: 2022-01-24 | End: 2022-01-24

## 2022-01-24 RX ORDER — 0.9 % SODIUM CHLORIDE 0.9 %
1000 INTRAVENOUS SOLUTION INTRAVENOUS ONCE
Status: COMPLETED | OUTPATIENT
Start: 2022-01-24 | End: 2022-01-24

## 2022-01-24 RX ORDER — MAGNESIUM HYDROXIDE 1200 MG/15ML
LIQUID ORAL
Status: COMPLETED
Start: 2022-01-24 | End: 2022-01-24

## 2022-01-24 RX ORDER — FENTANYL CITRATE 50 UG/ML
100 INJECTION, SOLUTION INTRAMUSCULAR; INTRAVENOUS ONCE
Status: COMPLETED | OUTPATIENT
Start: 2022-01-24 | End: 2022-01-24

## 2022-01-24 RX ADMIN — HYDROMORPHONE HYDROCHLORIDE 1 MG: 1 INJECTION, SOLUTION INTRAMUSCULAR; INTRAVENOUS; SUBCUTANEOUS at 22:24

## 2022-01-24 RX ADMIN — SODIUM CHLORIDE 1000 ML: 9 INJECTION, SOLUTION INTRAVENOUS at 21:17

## 2022-01-24 RX ADMIN — SODIUM CHLORIDE 1000 ML: 900 IRRIGANT IRRIGATION at 21:17

## 2022-01-24 RX ADMIN — TETANUS TOXOID, REDUCED DIPHTHERIA TOXOID AND ACELLULAR PERTUSSIS VACCINE, ADSORBED 0.5 ML: 5; 2.5; 8; 8; 2.5 SUSPENSION INTRAMUSCULAR at 20:11

## 2022-01-24 RX ADMIN — FENTANYL CITRATE 100 MCG: 50 INJECTION, SOLUTION INTRAMUSCULAR; INTRAVENOUS at 20:08

## 2022-01-24 RX ADMIN — ONDANSETRON 4 MG: 2 INJECTION INTRAMUSCULAR; INTRAVENOUS at 20:09

## 2022-01-24 RX ADMIN — HYDROMORPHONE HYDROCHLORIDE 1 MG: 1 INJECTION, SOLUTION INTRAMUSCULAR; INTRAVENOUS; SUBCUTANEOUS at 21:16

## 2022-01-24 RX ADMIN — CEFAZOLIN 2000 MG: 10 INJECTION, POWDER, FOR SOLUTION INTRAVENOUS at 20:10

## 2022-01-24 ASSESSMENT — PAIN DESCRIPTION - LOCATION: LOCATION: ARM

## 2022-01-24 ASSESSMENT — ENCOUNTER SYMPTOMS
COUGH: 0
NAUSEA: 0
BACK PAIN: 0
WHEEZING: 0
CONSTIPATION: 0
VOMITING: 0
DIARRHEA: 0
ABDOMINAL PAIN: 0
PHOTOPHOBIA: 0
SHORTNESS OF BREATH: 0

## 2022-01-24 ASSESSMENT — PAIN SCALES - GENERAL
PAINLEVEL_OUTOF10: 10

## 2022-01-24 ASSESSMENT — PAIN DESCRIPTION - ORIENTATION: ORIENTATION: LEFT

## 2022-01-25 LAB
ABO/RH: NORMAL
ANTIBODY SCREEN: NORMAL
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
## 2022-01-25 NOTE — ED NOTES
Pressure dressing with splint placed on patients right lower extremity at this time.       Lee Ann Corral RN  01/24/22 [id number]

## 2022-01-26 LAB
EKG ATRIAL RATE: 72 BPM
EKG P AXIS: 64 DEGREES
EKG P-R INTERVAL: 170 MS
EKG Q-T INTERVAL: 436 MS
EKG QRS DURATION: 146 MS
EKG QTC CALCULATION (BAZETT): 477 MS
EKG R AXIS: -49 DEGREES
EKG T AXIS: -11 DEGREES
EKG VENTRICULAR RATE: 72 BPM

## 2022-01-26 PROCEDURE — 93010 ELECTROCARDIOGRAM REPORT: CPT | Performed by: INTERNAL MEDICINE

## 2022-03-29 NOTE — PROGRESS NOTES
Order for Remdesivir received from Dr. UPT[facility]     1)  Confirmed drug availability for complete patient course of therapy (200 mg IV x 1, then 100 mg daily on days 2-5)    2)  Inclusion Criteria:  Reviewed/Confirmed with provider criteria present   Adults, children (?3.5Kg, only use lyophilized powder), or pregnant women with proven COVID19 as defined by a positive nasopharyngeal swab, tracheal aspirate or sputum   SARS-CoV-2 PCR or a positive serology test requiring hospitalization for COVID-19-severe pneumonia.    Requiring supplemental oxygen      Ref Range & Units 9/24/21 0144   SARS-CoV-2, NAAT Not Detected DETECTEDAbnormal         3)  Recommend prioritization of patients who present with symptom onset < 14 days and within 10 days of acute care admission     4)  Exclusion Criteria:  Reviewed/Confirmed none of the following  present: (criteria in bold present during review; risk/benefit rationale of physician documented)    Requiring invasive or non-invasive mechanical ventilation  A) Consider use in patients requiring high-flow oxygen early in the disease state (based on symptom duration)    Use of more than 1 vasopressor agent prior to start of remdesivir    Already improving on current treatment/supportive regimen as evidenced by improving oxygenation, and/or impending discharge    Patients in whom the clinical team think death is in the immediate short-term whereby administration of RDV unlikely to change clinical outcome     5) Monitoring Parameters:  CMP (includes hepatic panel) x 5 days    Consider discontinuation of remdesivir if LFTs substantially increase following initiation of therapy (ie: 5x baseline lab values) or if ALT elevation is accompanied by signs or symptoms of liver inflammation    GFR < 30mL/min: Use with caution due to risk of cyclodextrin toxicity with IV solution as it accumulates in reduced renal clearance       Recent Labs     09/23/21  2304   CREATININE 1.15   Estimated Creatinine See above note   Clearance: 75 mL/min (based on SCr of 1.15 mg/dL). .     Recent Labs     09/23/21  2304   ALT 25   AST 27       Will monitor CMP daily.      Thanks,   Dione Costa, [facility] PharmD